# Patient Record
Sex: MALE | Race: WHITE | URBAN - METROPOLITAN AREA
[De-identification: names, ages, dates, MRNs, and addresses within clinical notes are randomized per-mention and may not be internally consistent; named-entity substitution may affect disease eponyms.]

---

## 2020-11-10 ENCOUNTER — OFFICE VISIT (OUTPATIENT)
Dept: FAMILY MEDICINE CLINIC | Age: 45
End: 2020-11-10
Payer: COMMERCIAL

## 2020-11-10 VITALS
HEART RATE: 60 BPM | BODY MASS INDEX: 38.98 KG/M2 | OXYGEN SATURATION: 97 % | DIASTOLIC BLOOD PRESSURE: 80 MMHG | WEIGHT: 287.8 LBS | HEIGHT: 72 IN | SYSTOLIC BLOOD PRESSURE: 130 MMHG | TEMPERATURE: 97.3 F

## 2020-11-10 PROCEDURE — 90686 IIV4 VACC NO PRSV 0.5 ML IM: CPT | Performed by: STUDENT IN AN ORGANIZED HEALTH CARE EDUCATION/TRAINING PROGRAM

## 2020-11-10 PROCEDURE — 90472 IMMUNIZATION ADMIN EACH ADD: CPT | Performed by: STUDENT IN AN ORGANIZED HEALTH CARE EDUCATION/TRAINING PROGRAM

## 2020-11-10 PROCEDURE — 90715 TDAP VACCINE 7 YRS/> IM: CPT | Performed by: STUDENT IN AN ORGANIZED HEALTH CARE EDUCATION/TRAINING PROGRAM

## 2020-11-10 PROCEDURE — 90471 IMMUNIZATION ADMIN: CPT | Performed by: STUDENT IN AN ORGANIZED HEALTH CARE EDUCATION/TRAINING PROGRAM

## 2020-11-10 PROCEDURE — 99204 OFFICE O/P NEW MOD 45 MIN: CPT | Performed by: STUDENT IN AN ORGANIZED HEALTH CARE EDUCATION/TRAINING PROGRAM

## 2020-11-10 RX ORDER — MELOXICAM 15 MG/1
15 TABLET ORAL DAILY
Qty: 30 TABLET | Refills: 3 | Status: SHIPPED | OUTPATIENT
Start: 2020-11-10

## 2020-11-10 ASSESSMENT — ENCOUNTER SYMPTOMS
VOMITING: 0
NAUSEA: 0
SHORTNESS OF BREATH: 0
DIARRHEA: 0
CONSTIPATION: 0

## 2020-11-10 ASSESSMENT — PATIENT HEALTH QUESTIONNAIRE - PHQ9
SUM OF ALL RESPONSES TO PHQ QUESTIONS 1-9: 0
1. LITTLE INTEREST OR PLEASURE IN DOING THINGS: 0
SUM OF ALL RESPONSES TO PHQ QUESTIONS 1-9: 0
2. FEELING DOWN, DEPRESSED OR HOPELESS: 0
SUM OF ALL RESPONSES TO PHQ QUESTIONS 1-9: 0
SUM OF ALL RESPONSES TO PHQ9 QUESTIONS 1 & 2: 0

## 2020-11-10 NOTE — PROGRESS NOTES
Vaccine Information Sheet, \"Influenza - Inactivated\"  given to Ladell Mediate, or parent/legal guardian of  Ladell Mediate and verbalized understanding. Patient responses:    Have you ever had a reaction to a flu vaccine? No  Do you have any current illness? No  Have you ever had [place] Syndrome? No  Do you have a serious allergy to any of the follow: Neomycin, Polymyxin, Thimerosal, eggs or egg products? No    Flu vaccine given per order. Please see immunization tab. Risks and benefits explained. Current VIS given.       Immunizations Administered     Name Date Dose Route    Influenza, Quadv, IM, PF (6 mo and older Fluzone, Flulaval, Fluarix, and 3 yrs and older Afluria) 11/10/2020 0.5 mL Intramuscular    Site: Deltoid- Left    Lot: [id number]    NDC: 76852-043-14

## 2023-08-22 ENCOUNTER — TELEPHONE (OUTPATIENT)
Dept: FAMILY MEDICINE CLINIC | Age: 48
End: 2023-08-22

## 2023-09-29 ENCOUNTER — TELEPHONE (OUTPATIENT)
Dept: FAMILY MEDICINE CLINIC | Age: 48
End: 2023-09-29

## 2023-09-29 NOTE — TELEPHONE ENCOUNTER
LM for patient to call to schedule and/or let us know if he is still seeing Dr Lynda Veronica as PCP or going elsewhere.

## 2024-04-24 ENCOUNTER — OFFICE VISIT (OUTPATIENT)
Age: 49
End: 2024-04-24

## 2024-04-24 VITALS
HEART RATE: 54 BPM | TEMPERATURE: 98.6 F | DIASTOLIC BLOOD PRESSURE: 77 MMHG | OXYGEN SATURATION: 95 % | SYSTOLIC BLOOD PRESSURE: 114 MMHG

## 2024-04-24 DIAGNOSIS — G89.29 OTHER CHRONIC PAIN: Primary | ICD-10-CM

## 2024-04-24 DIAGNOSIS — M25.551 PAIN OF RIGHT HIP: ICD-10-CM

## 2024-04-24 NOTE — PROGRESS NOTES
Brentno Bryan (:  1975) is a 48 y.o. male,Established patient, here for evaluation of the following chief complaint(s):  Hip Pain (Right , Worse in last 7 days , states this has been going on for a while ,and it comes and goes )      ASSESSMENT/PLAN:    ICD-10-CM    1. Other chronic pain  G89.29 XR HIP RIGHT (2-3 VIEWS)     [facility]      2. Pain of right hip  M25.551           limited activities as discussed..   take tylenol/ibuprofen (dosage discussed..(unless has contraindications)  as needed for pain....  ... . Patient  to call orthopedic MD  to schedule follow up appointment to been seen and further evaluated.    Caution discussed when working above ground      Radiologist report pending....   my preliminary report: (-) for fracture or dislocation       FINDINGS:  There is no acute fracture or dislocation.  The bones are normally  mineralized.  There are no bony destructive lesions.  There is  mild-to-moderate right hip osteoarthritis.     IMPRESSION:  1. No acute abnormality.  ( Patient notified by phone @3;45 pm)  Still advised follow up with ortho.      SUBJECTIVE/OBJECTIVE:  Chronic right hip pain for past 4-5 months..more aware of pain past few weeks  No back pain,  pain non radiating.     no groin pain  No initial or recent injury   discomfort varies usually as to degree of pain felt..more of an ache, sometimes limits activities  .. starts to limit   walking activities by end of day   No claudication.  Non smoker....No knee pain      History provided by:  Patient   used: No    Hip Pain         Vitals:    24 1009   BP: 114/77   Pulse: 54   Temp: 98.6 °F (37 °C)   TempSrc: Temporal   SpO2: 95%       Review of Systems   Musculoskeletal:         Right hip pain       Physical Exam    Physical  Vitals signs: reviewed  Constitutional:  appearance: well nourished ..  does not appear acutely ill  ..no distress   Eyes:                 
ear pain

## 2024-04-24 NOTE — PATIENT INSTRUCTIONS
limited activities as discussed..   take tylenol/ibuprofen (dosage discussed..(unless has contraindications)  as needed for pain....  ... . Patient  to call orthopedic MD  to schedule follow up appointment to been seen and further evaluated.    Caution discussed when working above ground

## 2025-07-29 ENCOUNTER — OFFICE VISIT (OUTPATIENT)
Age: 50
End: 2025-07-29

## 2025-07-29 VITALS
DIASTOLIC BLOOD PRESSURE: 87 MMHG | OXYGEN SATURATION: 95 % | HEIGHT: 72 IN | HEART RATE: 53 BPM | TEMPERATURE: 97.9 F | SYSTOLIC BLOOD PRESSURE: 142 MMHG | BODY MASS INDEX: 36.57 KG/M2 | WEIGHT: 270 LBS

## 2025-07-29 DIAGNOSIS — L25.5 RHUS DERMATITIS: Primary | ICD-10-CM

## 2025-07-29 DIAGNOSIS — R03.0 ELEVATED BLOOD PRESSURE READING WITHOUT DIAGNOSIS OF HYPERTENSION: ICD-10-CM

## 2025-07-29 RX ORDER — PREDNISONE 10 MG/1
TABLET ORAL
Qty: 42 TABLET | Refills: 0 | Status: SHIPPED | OUTPATIENT
Start: 2025-07-29

## 2025-07-29 ASSESSMENT — ENCOUNTER SYMPTOMS
SHORTNESS OF BREATH: 0
RHINORRHEA: 0
COUGH: 0
VOMITING: 0
SORE THROAT: 0
DIARRHEA: 0

## 2025-07-29 NOTE — PROGRESS NOTES
Brenton Bryan (: 1975) is a 49 y.o. male, here for evaluation of the following chief complaint(s): Poison Ivy (On legs and fore arms, started , at times arms ans legs get tight from swelling, are red, itch )    Brenton Bryan is a: Established patient.   Last Urgent Care Visit: Visit date not found   I have reviewed the patient's medications and basic medical history; see Medication Reconciliation.    ASSESSMENT/PLAN:    ICD-10-CM    1. Rhus dermatitis  L25.5       2. Elevated blood pressure reading without diagnosis of hypertension  R03.0 Amb Referral to Primary Care          Exam and history consistent with rhus dermatitis  Low concern for urticaria, burns, cellulitis, folliculitis, and ringworm  Prescribed:  Prednisone 12 day taper  Recommended:  Not sharing towels/bed linens, contact precautions  OTC oral antihistamines with pepcid for the next several days  Cerave Anti-Itch lotion, colloid oatmeal, or calamine to help with itching  Strict ED precautions given, discussed monitoring for signs of infection  Discussed PCP follow up for persisting or worsening symptoms, or to return to the clinic if unable to obtain PCP follow up for worsening symptoms  The patient and/or the family were informed of the results of any tests, a time was given to answer questions, a plan was proposed and they agreed with plan. Reviewed AVS with treatment instructions and answered questions - pt/family expresses understanding and agreement with the discussed treatment plan and AVS instructions.  Initial BP >130/80. Rechecked manually ensuring appropriate size cuff before discharge (at least 5 min after first). BP was equal to or >130/80 on recheck. Patient referred to PCP per protocol. Low concerns for hypertensive crisis or end organ damage at this time given pt's current symptoms and exam findings. Discussed at-home monitoring of BP. If applicable, instructed to continue prescribed HTN medications.

## 2025-07-29 NOTE — PATIENT INSTRUCTIONS
Prednisone taper and topical steroids prescribed, limit the use of the topical steroids on your face  Use medications as prescribed  If vistaril was prescribed for itching, please know this will make you drowsy  Additionally take an over-the-counter antihistamine such as Claritin 20mg (two tabs) along with Pepcid 20mg daily for the next several days  You can also use Cerave Anti-Itch Lotion, Calamine, or colloid oatmeal to aid with itching  Anaphylaxis occurs rapidly after likely/known allergen exposure. The signs are as follows:  Throat/lip/tongue swelling  Abdominal cramping/pain and/or vomiting  Widespread hives  Low blood pressure  Difficulty breathing, shortness of breath, wheezing, or stridor (high pitched whistling sound)  Watch for signs of infection (such as swelling, increased tenderness, discharge, purulence, spreading red rash, heat), and return to the clinic or follow up with your PCP should any develop  Go to the ER if you develop any of the symptoms of anaphylaxis or any other concerning symptoms